# Patient Record
Sex: FEMALE | Race: WHITE
[De-identification: names, ages, dates, MRNs, and addresses within clinical notes are randomized per-mention and may not be internally consistent; named-entity substitution may affect disease eponyms.]

---

## 2021-03-22 ENCOUNTER — HOSPITAL ENCOUNTER (INPATIENT)
Dept: HOSPITAL 41 - JD.OBCHECK | Age: 36
LOS: 2 days | Discharge: HOME | DRG: 560 | End: 2021-03-24
Attending: OBSTETRICS & GYNECOLOGY | Admitting: OBSTETRICS & GYNECOLOGY
Payer: COMMERCIAL

## 2021-03-22 DIAGNOSIS — Z3A.38: ICD-10-CM

## 2021-03-22 DIAGNOSIS — Z20.822: ICD-10-CM

## 2021-03-22 PROCEDURE — 00HU33Z INSERTION OF INFUSION DEVICE INTO SPINAL CANAL, PERCUTANEOUS APPROACH: ICD-10-PCS

## 2021-03-22 PROCEDURE — U0002 COVID-19 LAB TEST NON-CDC: HCPCS

## 2021-03-22 PROCEDURE — 0KQM0ZZ REPAIR PERINEUM MUSCLE, OPEN APPROACH: ICD-10-PCS | Performed by: OBSTETRICS & GYNECOLOGY

## 2021-03-22 PROCEDURE — 3E0R3BZ INTRODUCTION OF ANESTHETIC AGENT INTO SPINAL CANAL, PERCUTANEOUS APPROACH: ICD-10-PCS

## 2021-03-22 RX ADMIN — EPHEDRINE SULFATE PRN MG: 50 INJECTION, SOLUTION INTRAVENOUS at 14:43

## 2021-03-22 RX ADMIN — EPHEDRINE SULFATE PRN MG: 50 INJECTION, SOLUTION INTRAVENOUS at 15:16

## 2021-03-22 RX ADMIN — EPHEDRINE SULFATE PRN MG: 50 INJECTION, SOLUTION INTRAVENOUS at 14:41

## 2021-03-22 NOTE — PCM.SN.2
- Free Text/Narrative


Note: 





Anesthesia Note: ([id number])





Placement of epidural 17 gauge tuohy resulted in wet tap.


Catheter advanced and secured at 12 cm.


Negative test dose noted, bolus of 5ml's of 0.25% marcaine given with negative 

aspiration, and negative heme.





Patient responded with excellent pain control. Motor control lost on right leg, 

diminished but present on left leg. 


Patient B/P decreased to 60's systolic with ephedrine and phenylephrine 

administered and B/P returned to 110's/ 40-50's.


Nurse instructed to maintain mean B/P at 70.





No Spinal catheter infusion initiated.


Nurse/and patient instructed to contact anesthesia for a spinal catheter rebolus

if warranted.





Patient and  educated on inadvertent dural puncture and educated on 

potential need for blood patch in the event a post dural puncture headache 

presents itself.





 Post Delivery:


Spinal catheter dc'd, tip in tact. Patient encouraged to drink caffeinated b

everages, and IV ketorlac ordered per Dr. Marie.





Anesthesia will continue to assess and check in on patient in the event a spinal

headache occurs and a blood patch is warranted.











Thank you,


Anuradha STOREY

## 2021-03-22 NOTE — PCM.PREANE
Preanesthetic Assessment





- Procedure


Proposed Procedure: 





Epidural





- Anesthesia/Transfusion/Family Hx


Anesthesia History: Prior Anesthesia Without Reaction


Family History of Anesthesia Reaction: No


Transfusion History: No Prior Transfusion(s)


Intubation History: Unknown





- Review of Systems


General: No Symptoms


Pulmonary: No Symptoms


Cardiovascular: No Symptoms (History of preeclampsia)


Gastrointestinal: No Symptoms (GERD)


Neurological: No Symptoms


Other: Reports: None





- Physical Assessment


NPO Status Date: 03/22/21


NPO Status Time: 08:00


Vital Signs: 





                                Last Vital Signs











Temp  36.8 C   03/22/21 11:21


 


Pulse  80   03/22/21 11:21


 


Resp  16   03/22/21 11:21


 


BP  141/80 H  03/22/21 11:21


 


Pulse Ox  99   03/22/21 11:21











Height: 1.6 m


Weight: 101.151 kg


ASA Class: 3


Mental Status: Alert & Oriented x3


Airway Class: Mallampati = 2


Dentition: Reports: Normal Dentition, Caries


Thyro-Mental Finger Breadths: 3


Mouth Opening Finger Breadths: 3


ROM/Head Extension: Full


Lungs: Clear to Auscultation, Normal Respiratory Effort


Cardiovascular: Regular Rate, Regular Rhythm, No Murmurs





- Lab


Values: 





                             Laboratory Last Values











WBC  13.44 K/mm3 (3.98-10.04)  H  03/22/21  12:12    


 


RBC  4.07 M/mm3 (3.98-5.22)   03/22/21  12:12    


 


Hgb  12.1 gm/dl (11.2-15.7)   03/22/21  12:12    


 


Hct  36.2 % (34.1-44.9)   03/22/21  12:12    


 


MCV  88.9 fl (79.4-94.8)  D 03/22/21  12:12    


 


MCH  29.7 pg (25.6-32.2)   03/22/21  12:12    


 


MCHC  33.4 g/dl (32.2-35.5)   03/22/21  12:12    


 


RDW Std Deviation  46.4 fL (36.4-46.3)  H  03/22/21  12:12    


 


Plt Count  219 K/mm3 (182-369)  D 03/22/21  12:12    


 


MPV  10.5 fl (9.4-12.3)   03/22/21  12:12    


 


Neut % (Auto)  85.2 % (34.0-71.1)  H  03/22/21  12:12    


 


Lymph % (Auto)  8.0 % (19.3-51.7)  L  03/22/21  12:12    


 


Mono % (Auto)  6.0 % (4.7-12.5)   03/22/21  12:12    


 


Eos % (Auto)  0.2  (0.7-5.8)  L  03/22/21  12:12    


 


Baso % (Auto)  0.1 % (0.1-1.2)   03/22/21  12:12    


 


Neut # (Auto)  11.45 K/mm3 (1.56-6.13)  H  03/22/21  12:12    


 


Lymph # (Auto)  1.08 K/mm3 (1.18-3.74)  L  03/22/21  12:12    


 


Mono # (Auto)  0.80 K/mm3 (0.24-0.36)  H  03/22/21  12:12    


 


Eos # (Auto)  0.03 K/mm3 (0.04-0.36)  L  03/22/21  12:12    


 


Baso # (Auto)  0.01 K/mm3 (0.01-0.08)   03/22/21  12:12    


 


Manual Slide Review  Abnormal smear   03/22/21  12:12    


 


SARS-CoV-2 RNA (BARTOLO)  Negative  (NEGATIVE)   03/22/21  12:15    


 


Blood Type  A POSITIVE   03/22/21  12:12    


 


Gel Antibody Screen  Negative   03/22/21  12:12    








Above labs reviewed and noted and within acceptable ranges to proceed with 

epidural if desired.





- Allergies


Allergies/Adverse Reactions: 


                                    Allergies











Allergy/AdvReac Type Severity Reaction Status Date / Time


 


No Known Allergies Allergy   Verified 03/22/21 11:21














- Anesthesia Plan


Pre-Op Medication Ordered: None





- Acknowledgements


Anesthesia Type Planned: Epidural


Pt an Appropriate Candidate for the Planned Anesthesia: Yes


Alternatives and Risks of Anesthesia Discussed w Pt/Guardian: Yes


Pt/Guardian Understands and Agrees with Anesthesia Plan: Yes





PreAnesthesia Questionnaire





- Past Health History


Medical/Surgical History: Denies Medical/Surgical History


OB/GYN History: Reports: Pregnancy


Psychiatric History: Reports: Depression


Other Psychiatric History: Postpartum after first baby


Endocrine/Metabolic History: Reports: Obesity/BMI 30+





- SUBSTANCE USE


Tobacco Use Status *Q: Never Tobacco User


Second Hand Smoke Exposure: No


Recreational Drug Use History: No





- HOME MEDS


Home Medications: 


                                    Home Meds





Acetaminophen/oxyCODONE [Percocet 325-5 MG] 2 tab PO Q4H PRN  tablet 07/13/19 

[Rx]


Ibuprofen [Motrin] 800 mg PO Q8H PRN  tablet 07/13/19 [Rx]


Pnv No.95/Ferrous Fum/Folic AC [Prenavite Tablet] 1 each PO DAILY #100 tablet 

07/13/19 [Rx]











- CURRENT (IN HOUSE) MEDS


Current Meds: 





                               Current Medications





Lactated Ringer's (Ringers, Lactated)  1,000 mls @ 100 mls/hr IV ASDIRECTED SALOME


   Last Infusion: 03/22/21 13:31 Dose:  0 mls/hr


   Documented by: 


Ampicillin Sodium 1 gm/ Sodium (Chloride)  100 mls @ 200 mls/hr IV Q4H SALOME


Oxytocin/Lactated Ringer's (Pitocin In Lr 10 Units/1,000 Ml)  10 unit in 1,000 

mls @ 12 mls/hr IV TITRATE SALOME; Protocol


Oxytocin/Lactated Ringer's (Pitocin In Lr 10 Units/1,000 Ml)  10 unit in 1,000 

mls @ 500 mls/hr IV .CONTINUOUS SALOME


Nalbuphine HCl (Nalbuphine 10 Mg/1 Ml Vial)  10 mg IVPUSH Q2H PRN


   PRN Reason: Pain


Ondansetron HCl (Ondansetron 4 Mg/2 Ml Sdv)  4 mg IVPUSH Q4H PRN


   PRN Reason: Nausea/Vomiting


Sodium Chloride (Sodium Chloride 0.9% 10 Ml Syringe)  10 ml FLUSH ASDIRECTED PRN


   PRN Reason: Keep Vein Open





Discontinued Medications





Ampicillin Sodium 2 gm/ Sodium (Chloride)  100 mls @ 200 mls/hr IV ONETIME ONE


   Stop: 03/22/21 12:29


   Last Admin: 03/22/21 12:30 Dose:  200 mls/hr


   Documented by:

## 2021-03-22 NOTE — PCM.LDHP
L&D History of Present Illness





- General


Date of Service: 21


Admit Problem/Dx: 


                   Patient Status Order with Admit Dx/Problem





21 11:21


Patient Status [ADT] Routine 





21 11:51


Patient Status [ADT] Routine 








                           Admission Diagnosis/Problem











Admission Diagnosis/Problem    Pregnancy














Source of Information: Patient


History Limitations: Reports: No Limitations





- History of Present Illness


Introduction:: 





Valorie Pena is a GBS + A+ 35 year old   x 1  for breech 

presentation x 1 female at 38-6 weeks gestation age (SCOT 21) by early US 

and LMP who presents today for signs of labor. She reports contractions that 

began at 0645 that have been increasing in strength and frequency. She reports 

bloody show, but denies chivo bleeding. She denies leaking of fluid. 


Present Illness Comments:: 





Valorie Pena is a GBS + A+ 35 year old   x 1  for breech 

presentation x 1 female at 38-6 weeks gestation age (SCOT 21) by early US 

and LMP who presents today for signs of labor. Patient has received routine 

prenatal care and denies any complications during her pregnancy. She received 

the Tdap vaccine on 21 and her flu vaccie 10/27/20. 





OBGYN History





11/19/15:  with vaccum extraction following induction due to preeclampsia; 

male infant weighing 7 lbs 0 ounces, "Gaurav"


19:  for breech presentation following failed cephalic version 

with tight nuchal cord and true knot. Female infant weighing 8 lbs 15 ounces, 

"Guru"


G3: current pregnancy





Prenatal labs: 


Blood type: A+ 


Antibody screen: Negative 


Rubella status: immune


Hepatitis B surface antigen: unknown


RPR: negative 


HIV: negative


Gonorrhea: Negative


Chlamydia: negative


Anatomy US: 10/29/20 with follow up 20 Normal growth, KELLY, placental 

position, anatomy 


One hour glucose tolerance test: 82


Second trimester hematocrit/hemoglobin: 12.6


Platelets: 341,000


GBS status: positive





- Related Data


Allergies/Adverse Reactions: 


                                    Allergies











Allergy/AdvReac Type Severity Reaction Status Date / Time


 


No Known Allergies Allergy   Verified 21 11:21











Home Medications: 


                                    Home Meds





Acetaminophen/oxyCODONE [Percocet 325-5 MG] 2 tab PO Q4H PRN  tablet 19 

[Rx]


Ibuprofen [Motrin] 800 mg PO Q8H PRN  tablet 19 [Rx]


Pnv No.95/Ferrous Fum/Folic AC [Prenavite Tablet] 1 each PO DAILY #100 tablet 

19 [Rx]











Past Medical History





- Past Health History


Medical/Surgical History: Denies Medical/Surgical History


OB/GYN History: Reports: Pregnancy





Social & Family History





- Family History


Family Medical History: No Pertinent Family History





- Caffeine Use


Caffeine Use: Reports: None





H&P Review of Systems





- Review of Systems:


Review Of Systems: See Below


General: Reports: No Symptoms


HEENT: Reports: No Symptoms


Pulmonary: Reports: No Symptoms


Cardiovascular: Reports: No Symptoms


Gastrointestinal: Reports: No Symptoms


Genitourinary: Reports: No Symptoms


Musculoskeletal: Reports: No Symptoms


Skin: Reports: No Symptoms


Psychiatric: Reports: No Symptoms


Neurological: Reports: No Symptoms





L&D Exam





- Exam


Exam: See Below





- Vital Signs


Vital Signs: 


                                Last Vital Signs











Temp  98.3 F   21 11:21


 


Pulse  80   21 11:21


 


Resp  16   21 11:21


 


BP  141/80 H  21 11:21


 


Pulse Ox  99   21 11:21











Weight: 223 lb





- OB Specific


Contraction Intensity: Moderate to Strong


Fetal Movement: Active


Fetal Heart Tones: Present





- Sandoval Score


Sandoval Score Cervix Position: Midposition


Sandoval Score Consistency: Soft


Sandoval Score Effacement: >80%


Sandoval Score Dilation: 3-4 cm


Sandoval Score Infant's Station: -2


Sandoval Score Total: 9





- Exam


General: Alert, Oriented


HEENT: Conjunctiva Clear, EOMI


Lungs: Clear to Auscultation, Normal Respiratory Effort


Cardiovascular: Regular Rate, Regular Rhythm


GI/Abdominal Exam: Normal Bowel Sounds, Soft


Genitourinary: Normal external exam


Extremities: Normal Inspection, No Pedal Edema, Normal Capillary Refill


Skin: Warm, Dry, Intact


Psychiatric: Alert, Normal Affect, Normal Mood





- Patient Data


Lab Results Last 24 hrs: 


                         Laboratory Results - last 24 hr











  21 Range/Units





  12:12 


 


WBC  13.44 H  (3.98-10.04)  K/mm3


 


RBC  4.07  (3.98-5.22)  M/mm3


 


Hgb  12.1  (11.2-15.7)  gm/dl


 


Hct  36.2  (34.1-44.9)  %


 


MCV  88.9  D  (79.4-94.8)  fl


 


MCH  29.7  (25.6-32.2)  pg


 


MCHC  33.4  (32.2-35.5)  g/dl


 


RDW Std Deviation  46.4 H  (36.4-46.3)  fL


 


Plt Count  219  D  (182-369)  K/mm3


 


MPV  10.5  (9.4-12.3)  fl


 


Neut % (Auto)  85.2 H  (34.0-71.1)  %


 


Lymph % (Auto)  8.0 L  (19.3-51.7)  %


 


Mono % (Auto)  6.0  (4.7-12.5)  %


 


Eos % (Auto)  0.2 L  (0.7-5.8)  


 


Baso % (Auto)  0.1  (0.1-1.2)  %


 


Neut # (Auto)  11.45 H  (1.56-6.13)  K/mm3


 


Lymph # (Auto)  1.08 L  (1.18-3.74)  K/mm3


 


Mono # (Auto)  0.80 H  (0.24-0.36)  K/mm3


 


Eos # (Auto)  0.03 L  (0.04-0.36)  K/mm3


 


Baso # (Auto)  0.01  (0.01-0.08)  K/mm3


 


Manual Slide Review  Abnormal smear  











Result Diagrams: 


                                 21 12:12








- Problem List


(1) 38 weeks gestation of pregnancy


SNOMED Code(s): 11151808


   ICD Code: Z3A.38 - 38 WEEKS GESTATION OF PREGNANCY   Status: Acute   Current 

Visit: Yes   





(2) Group B streptococcal carriage complicating pregnancy


SNOMED Code(s): 695648620725495


   ICD Code: O99.820 - STREPTOCOCCUS B CARRIER STATE COMPLICATING PREGNANCY   

Status: Acute   Current Visit: Yes   





(3) History of pre-eclampsia


SNOMED Code(s): 322484526999180


   ICD Code: Z87.59 - PERSONAL HISTORY OF COMP OF PREG, CHLDBRTH AND THE PUERP  

 Status: Acute   Current Visit: Yes   





(4) History of 


SNOMED Code(s): 423192576


   ICD Code: Z98.891 - HISTORY OF UTERINE SCAR FROM PREVIOUS SURGERY   Status: 

Acute   Current Visit: Yes   


Problem List Initiated/Reviewed/Updated: Yes


Orders Last 24hrs: 


                               Active Orders 24 hr











 Category Date Time Status


 


 Patient Status [ADT] Routine ADT  21 11:51 Active


 


 Activity as Tolerated [RC] PFP Care  21 11:51 Active


 


 Communication Order [RC] ASDIRECTED Care  21 11:51 Active


 


 Fetal Heart Tones [RC] ASDIRECTED Care  21 11:51 Active


 


 Fetal Non Stress Test [RC] PER UNIT ROUTINE Care  21 11:21 Active


 


 Notify Provider [RC] PFP Care  21 11:51 Active


 


 Notify Provider [RC] PRN Care  21 11:51 Active


 


 Peripheral IV Care [RC] .AS DIRECTED Care  21 11:51 Active


 


 Pump Management, Intrathecal [RC] ASDIRECTED Care  21 11:52 Active


 


 Vital Signs [RC] PER UNIT ROUTINE Care  21 11:21 Active


 


 Vital Signs [RC] PER UNIT ROUTINE Care  21 11:51 Active


 


 Regular Diet [DIET] Diet  21 Lunch Active


 


 CORONAVIRUS COVID-19 BARTOLO [MOLEC] Stat Lab  21 12:15 Received


 


 HEP C VIRUS AB [REF] Stat Lab  21 12:12 Received


 


 RAPID PLASMA REAGIN,RPR [CHEM] Routine Lab  21 12:12 Received


 


 TYPE AND SCREEN [BBK] Stat Lab  21 12:12 Received


 


 Ampicillin 1 gm Med  21 16:00 Active





 Sodium Chloride 0.9% [Normal Saline] 100 ml   





 IV Q4H   


 


 Lactated Ringers [Ringers, Lactated] 1,000 ml Med  21 12:00 Active





 IV ASDIRECTED   


 


 Nalbuphine [Nubain] Med  21 11:51 Active





 10 mg IVPUSH Q2H PRN   


 


 Ondansetron [Zofran] Med  21 11:51 Active





 4 mg IVPUSH Q4H PRN   


 


 Oxytocin/Lactated Ringers [Pitocin in LR 10 Units/1,000 Med  21 12:00 

Active





 ML]   





 10 unit in 1,000 ml IV .CONTINUOUS   


 


 Oxytocin/Lactated Ringers [Pitocin in LR 10 Units/1,000 Med  21 12:00 

Active





 ML]   





 10 unit in 1,000 ml IV TITRATE   


 


 Sodium Chloride 0.9% [Saline Flush] Med  21 11:51 Active





 10 ml FLUSH ASDIRECTED PRN   


 


 Electronic Fetal Heart Tones Ext w TOCO [WOMSER] Oth  21 11:51 Ordered





 Routine   


 


 Electronic Fetal Heart Tones Internal [WOMSER] Per Unit Oth  21 11:51 

Ordered





 Routine   


 


 Peripheral IV Insertion Adult [OM.PC] Routine Oth  21 11:51 Ordered


 


 Resuscitation Status Routine Resus Stat  21 11:21 Ordered








                                Medication Orders





Lactated Ringer's (Ringers, Lactated)  1,000 mls @ 100 mls/hr IV ASDIRECTED SALOME


   Last Admin: 21 12:30  Dose: 100 mls/hr


   Documented by: [email or web address]


Ampicillin Sodium 1 gm/ Sodium (Chloride)  100 mls @ 200 mls/hr IV Q4H SALOME


Oxytocin/Lactated Ringer's (Pitocin In Lr 10 Units/1,000 Ml)  10 unit in 1,000 

mls @ 12 mls/hr IV TITRATE SALOME; Protocol


Oxytocin/Lactated Ringer's (Pitocin In Lr 10 Units/1,000 Ml)  10 unit in 1,000 

mls @ 500 mls/hr IV .CONTINUOUS SALOME


Nalbuphine HCl (Nalbuphine 10 Mg/1 Ml Vial)  10 mg IVPUSH Q2H PRN


   PRN Reason: Pain


Ondansetron HCl (Ondansetron 4 Mg/2 Ml Sdv)  4 mg IVPUSH Q4H PRN


   PRN Reason: Nausea/Vomiting


Sodium Chloride (Sodium Chloride 0.9% 10 Ml Syringe)  10 ml FLUSH ASDIRECTED PRN


   PRN Reason: Keep Vein Open








Assessment/Plan Comment:: 





Valorie Pena is a GBS + A+ 35 year old   x 1  for breech 

presentation x 1 female at 38-6 weeks gestation age (SCOT 21) by early US 

and LMP who presents today for signs of labor. Patient desires a TOLAC. 








AROM was performed; scant blood tinged fluid noted. Mother and baby tolerated 

the procedure well. 





1. Active labor - desires TOLAC. Risks and benefits discussed with patient. Head

position confirmed with bedside US. Augmentation of labor with AROM and Pitocin 

as indicated


2. GBS + - 2 mg ampicillin loading dose with 1 mg q 4 hours 


3. A+ with negative antibody screen


4. Rubella immune


5. Continuous monitoring


6. Activity as tolerated


7. Small amounts of regular diet


8. Pain management as patient desires 


9. Plans to breastfeed


10. Anticipate vaginal delivery unless otherwise indicated

## 2021-03-22 NOTE — PCM.SN.2
- Free Text/Narrative


Note: 





Anesthesia Note:





Start: 1955


Stop: 2000





Patient dilated to 9cm. Patient c/o pain 9/10. Patient requests spinal catheter 

(inadvertent) rebolused for pain control.





2ml's of 1.5% lidocaine with 1:200,000 epi with 3ml's of 0.25% bupivacaine 

administered.





Patient received excellent pain relief.


200mcg of phenylephrine given for symptomatic hypotension systolic in the 80's.


Blood pressure returned to 110's and patient resting comfortably.





Thank you,


Anuradha STOREY

## 2021-03-22 NOTE — PCM.SN.2
- Free Text/Narrative


Note: 





Valorie Pena is a GBS + A+ 35 year old [id number] of  x 1, 

for breech presentation x 1 female at 38-6 weeks gestation age (SOCT 21) by

early US and LMP who presents today for signs of labor. She reports contractions

that began at 0645 that have been increasing in strength and frequency.


Babies heart tones were reassuring with contractions occurring every 3 to 5 

minutes.  She progressed steadily towards complete dilation.  She had an 

epidural placed for labor analgesia with fair results.  There were questions as 

to whether this was a wet tap.





Patient became complete and pushed for approximately 30 minutes.  She delivered 

a viable, hansen, male infant with Apgars of 7 and 9, a weight of 4290 g (9 

pounds 7.3 ounces) and a length of 22.0 inches at 2102 hrs. on 3/22/2021..  The 

patient had a moderate shoulder dystocia and the anterior shoulder was delivered

with moderate posterior motion of the anterior shoulder, along with a sweeping 

finger movement bring the anterior shoulder forward.  This was further 

accompanied by suprapubic pressure offered by labor and delivery nurse Yeny. 

With this baby delivered was placed on mom's abdomen.  The cord was clamped x2 

and cut by the baby's Father Luis Felipe.





Patient was noted to have a second-degree perineal laceration.  IV Pitocin was 

increased to 500 cc an hour per with Pitocin solution per protocol to facilitate

increase in uterine tone and decrease likelihood of bleeding.  Nose and mouth 

were bulb suction and the baby was taken to the warmer for further evaluation 

and resuscitation.  The baby spontaneously cried and actually pinked up very 

well.  No positive pressure ventilation was necessary.





The placenta delivered in a Alba presentation at 2109 hrs., appeared intact 

and complete and was discarded per patient desire.  The umbilical cord had 3 

vessels present within it.





The second-degree perineal laceration area was infiltrated with lidocaine 1% 

approximately 15 cc.  I was then sutured in a routine fashion using 3-0 Monocryl

suture.  Patient tolerated this well.





Patient plans to breast-feed.  Estimated blood loss was 350 cc.  Condition: 

Good.

## 2021-03-23 NOTE — PCM.PNPP
- General Info


Date of Service: 21


Subjective Update: 





No complaints of headache. Doing well.


Some cramping. Nursing well.





- Review of Systems


General: Reports: No Symptoms


HEENT: Reports: No Symptoms


Pulmonary: Reports: No Symptoms


Cardiovascular: Reports: No Symptoms


Gastrointestinal: Reports: No Symptoms


Genitourinary: Reports: No Symptoms


Musculoskeletal: Reports: No Symptoms


Skin: Reports: No Symptoms


Neurological: Reports: No Symptoms


Psychiatric: Reports: No Symptoms





- General Info


Date of Service: 21





- Patient Data


Vital Signs - Most Recent: 


                                Last Vital Signs











Temp  36.9 C   21 02:24


 


Pulse  92   21 02:24


 


Resp  16   21 02:24


 


BP  124/69   21 02:24


 


Pulse Ox  98   21 02:24











Weight - Most Recent: 101.151 kg


I&O - Last 24 Hours: 


                                 Intake & Output











 21





 22:59 06:59 14:59


 


Intake Total  6300 


 


Output Total 1100 458 


 


Balance -1100 5842 











Lab Results - Last 24 Hours: 


                         Laboratory Results - last 24 hr











  21 Range/Units





  12:12 12:12 12:12 


 


WBC   13.44 H   (3.98-10.04)  K/mm3


 


RBC   4.07   (3.98-5.22)  M/mm3


 


Hgb   12.1   (11.2-15.7)  gm/dl


 


Hct   36.2   (34.1-44.9)  %


 


MCV   88.9  D   (79.4-94.8)  fl


 


MCH   29.7   (25.6-32.2)  pg


 


MCHC   33.4   (32.2-35.5)  g/dl


 


RDW Std Deviation   46.4 H   (36.4-46.3)  fL


 


Plt Count   219  D   (182-369)  K/mm3


 


MPV   10.5   (9.4-12.3)  fl


 


Neut % (Auto)   85.2 H   (34.0-71.1)  %


 


Lymph % (Auto)   8.0 L   (19.3-51.7)  %


 


Mono % (Auto)   6.0   (4.7-12.5)  %


 


Eos % (Auto)   0.2 L   (0.7-5.8)  


 


Baso % (Auto)   0.1   (0.1-1.2)  %


 


Neut # (Auto)   11.45 H   (1.56-6.13)  K/mm3


 


Lymph # (Auto)   1.08 L   (1.18-3.74)  K/mm3


 


Mono # (Auto)   0.80 H   (0.24-0.36)  K/mm3


 


Eos # (Auto)   0.03 L   (0.04-0.36)  K/mm3


 


Baso # (Auto)   0.01   (0.01-0.08)  K/mm3


 


Manual Slide Review   Abnormal smear   


 


RPR  Non-reactive    (NONREACTIVE)  


 


SARS-CoV-2 RNA ([place])     (NEGATIVE)  


 


Blood Type    A POSITIVE  


 


Gel Antibody Screen    Negative  














  21 Range/Units





  12:15 


 


WBC   (3.98-10.04)  K/mm3


 


RBC   (3.98-5.22)  M/mm3


 


Hgb   (11.2-15.7)  gm/dl


 


Hct   (34.1-44.9)  %


 


MCV   (79.4-94.8)  fl


 


MCH   (25.6-32.2)  pg


 


MCHC   (32.2-35.5)  g/dl


 


RDW Std Deviation   (36.4-46.3)  fL


 


Plt Count   (182-369)  K/mm3


 


MPV   (9.4-12.3)  fl


 


Neut % (Auto)   (34.0-71.1)  %


 


Lymph % (Auto)   (19.3-51.7)  %


 


Mono % (Auto)   (4.7-12.5)  %


 


Eos % (Auto)   (0.7-5.8)  


 


Baso % (Auto)   (0.1-1.2)  %


 


Neut # (Auto)   (1.56-6.13)  K/mm3


 


Lymph # (Auto)   (1.18-3.74)  K/mm3


 


Mono # (Auto)   (0.24-0.36)  K/mm3


 


Eos # (Auto)   (0.04-0.36)  K/mm3


 


Baso # (Auto)   (0.01-0.08)  K/mm3


 


Manual Slide Review   


 


RPR   (NONREACTIVE)  


 


SARS-CoV-2 RNA ([place])  Negative  (NEGATIVE)  


 


Blood Type   


 


Gel Antibody Screen   











Med Orders - Current: 


                               Current Medications





Acetaminophen (Acetaminophen 325 Mg Tab)  650 mg PO Q4H PRN


   PRN Reason: mild pain or fever


Benzocaine/Menthol (Benzocaine/Menthol 20%-0.5% Spray 56 Gm Canister)  0 gm TOP 

ASDIRECTED PRN


   PRN Reason: Perineal Comfort Measure


   Last Admin: 21 23:23 Dose:  1 can


   Documented by: 


Docusate Sodium (Docusate Sodium 100 Mg Cap)  100 mg PO BID PRN


   PRN Reason: Constipation


Lactated Ringer's (Ringers, Lactated)  1,000 mls @ 100 mls/hr IV ASDIRECTED Formerly [facility], 1928–1983


   Last Admin: 21 01:24 Dose:  100 mls/hr


   Documented by: 


Ibuprofen (Ibuprofen 600 Mg Tab)  600 mg PO Q4H PRN


   PRN Reason: Mild pain or fever


   Last Admin: 21 10:18 Dose:  600 mg


   Documented by: 


Witch Hazel (Witch Hazel Medicated Pads 40/Jar)  1 pad TOP ASDIRECTED PRN


   PRN Reason: Perineal Comfort Measure


   Last Admin: 21 23:24 Dose:  1 tub


   Documented by: 





Discontinued Medications





Ephedrine Sulfate (Ephedrine 50 Mg/Ml Sdv)  5 mg IVPUSH ASDIRECTED PRN


   PRN Reason: Hypotension


   Last Admin: 21 15:16 Dose:  5 mg


   Documented by: 


Fentanyl (Fentanyl 100 Mcg/2 Ml Sdv)  100 mcg EPIDUR Q3H PRN


   PRN Reason: Pain


   Last Admin: 21 14:03 Dose:  100 mcg


   Documented by: 


Fentanyl/Bupivacaine HCl (Bupivacaine/Fentanyl/Ns 100 Ml Bag)  100 ml EPIDUR 

ASDIRECTED [facility], 1928–1983


   Last Admin: 21 14:03 Dose:  100 ml


   Documented by: 


Lactated Ringer's (Ringers, Lactated)  1,000 mls @ 100 mls/hr IV ASDIRECTED [facility], 1928–1983


   Last Admin: 21 20:00 Dose:  125 mls/hr


   Documented by: 


Ampicillin Sodium 2 gm/ Sodium (Chloride)  100 mls @ 200 mls/hr IV ONETIME ONE


   Stop: 21 12:29


   Last Admin: 21 12:30 Dose:  200 mls/hr


   Documented by: 


Ampicillin Sodium 1 gm/ Sodium (Chloride)  100 mls @ 200 mls/hr IV Q4H [facility], 1928–[facility]


   Last Admin: 21 20:00 Dose:  200 mls/hr


   Documented by: 


Oxytocin/Lactated Ringer's (Pitocin In Lr 10 Units/1,000 Ml)  10 unit in 1,000 

mls @ 12 mls/hr IV TITRATE SALOME; Protocol


Oxytocin/Lactated Ringer's (Pitocin In Lr 10 Units/1,000 Ml)  10 unit in 1,000 

mls @ 500 mls/hr IV .CONTINUOUS SALOME


   Last Admin: 21 21:02 Dose:  500 mls/hr


   Documented by: 


Lidocaine HCl (Xylocaine-Mpf 1%) Confirm Administered Dose 8 mls @ as directed 

.ROUTE .STK-MED ONE


   Stop: 21 02:43


Ketorolac Tromethamine (Ketorolac 30 Mg/Ml Sdv)  30 mg IVPUSH ONETIME ONE


   Stop: 21 22:45


   Last Admin: 21 23:08 Dose:  30 mg


   Documented by: 


Lidocaine HCl (Lidocaine 1% 50 Ml Mdv) Confirm Administered Dose 50 ml .ROUTE 

.STK-MED ONE


   Stop: 21 21:07


   Last Admin: 21 04:46 Dose:  Not Given


   Documented by: 


Lidocaine HCl (Lidocaine 1% 20 Ml Mdv)  50 ml INJECT ONETIME ONE


   Stop: 21 01:07


   Last Admin: 21 21:10 Dose:  10 ml


   Documented by: 


Miscellaneous Medication (Phenylephrine Hcl In 0.9% Nacl 1 Mg/10 Ml Syringe)  

0.1 mg IVPUSH Q10M PRN


   PRN Reason: Hypotension


   Last Admin: 21 14:54 Dose:  0.1 mg


   Documented by: 


Nalbuphine HCl (Nalbuphine 10 Mg/1 Ml Vial)  10 mg IVPUSH Q2H PRN


   PRN Reason: Pain


Ondansetron HCl (Ondansetron 4 Mg/2 Ml Sdv)  4 mg IVPUSH Q4H PRN


   PRN Reason: Nausea/Vomiting


Ondansetron HCl (Ondansetron 4 Mg/2 Ml Sdv)  4 mg IVPUSH ONETIME PRN


   PRN Reason: Nausea/Vomiting


Sodium Chloride (Sodium Chloride 0.9% 10 Ml Syringe)  10 ml FLUSH ASDIRECTED PRN


   PRN Reason: Keep Vein Open











- Infant Interaction


Infant Disposition, Postpartum:  in Room with Family


Infant Interaction: Holding Infant


Support Person: 





- Postpartum Recovery Exam


Fundal Tone: Firm


Fundal Level: 1 Fingerbreadths Below Umbilicus


Fundal Placement: Midline


Lochia Amount: Small


Lochia Color: Rubra/Red


Episiotomy/Laceration: Approximated


Bladder Status: Voiding





- Exam


General: Alert, Oriented


HEENT: Pupils Equal


Neck: Supple


Lungs: Clear to Auscultation, Normal Respiratory Effort


Cardiovascular: Regular Rate, Regular Rhythm


GI/Abdominal Exam: Normal Bowel Sounds, Soft, Non-Tender, No Organomegaly, No 

Distention, No Abnormal Bruit, No Mass, Pelvis Stable


Extremities: Normal Inspection, Normal Range of Motion, Non-Tender, No Pedal 

Edema, Normal Capillary Refill


Neurological: No New Focal Deficit


Psy/Mental Status: Alert, Normal Affect, Normal Mood





- Problem List Review


Problem List Initiated/Reviewed/Updated: Yes





- Assessment


Assessment:: 





PPD1


doing great.


Minimal residual spinal headache when up.








- Plan


Plan:: 





Valorie Pena is a GBS + A+ 35 year old   x 1  for breech 

presentation x 1 female at 38-6 weeks gestation age (SCOT 21) by early US 

and LMP who presents today for signs of labor. Patient desired a TOLAC. 








S/P .


Doing well.


No complaints.


Home tomorrow

## 2021-03-23 NOTE — [id number]
Post Anesthesia Note





- EVALUATION WITHIN 48HRS OF ANESTHETIC


Vital Signs in Normal Range: Yes


Patient Participated in Evaluation: Yes


Respiratory Function Stable: Yes


Airway Patent: Yes


Cardiovascular Function Stable: Yes


Hydration Status Stable: Yes


Pain Control Satisfactory: Yes


Nausea and Vomiting Control Satisfactory: Yes


Mental Status Recovered: Yes


Vital Signs: 


                                Last Vital Signs











Temp  36.9 C   03/23/21 02:24


 


Pulse  92   03/23/21 02:24


 


Resp  16   03/23/21 02:24


 


BP  124/69   03/23/21 02:24


 


Pulse Ox  98   03/23/21 02:24














- COMMENTS/OBSERVATIONS


Free Text/Narrative:: 





Valorie developed a positional headache following a known post dural puncture 

for epidural placement. Blood patch was administered at 0143 last evening. She 

was able to get some rest last evening. She did get up this morning and noticed 

the headache but did feel like it was better than before the blood patch. 

Education provided for Valorie to dink lots of fluids today, try caffeine to 

help with the headache, tylenol/motrin, and rest when possible. We will continue

to monitor for improvement in headache symptoms. Questions answered at this 

time.

## 2021-03-23 NOTE — PCM.SN.2
- Free Text/Narrative


Note: 





Anesthesia Note:  (Blood Patch)





Start: 0143


Stop: 0225





Patient presents with PDPH that is positional and patient requests treatment 

relief via blood patch.


Risk/Benefits discussed, consent signed.





Patient sitting up for procedure. (Sterile technique noted with sterile gloves, 

mask, and sterile drapes utilized)





-Back cleansed with 3 betadine swabs.


-L3-L4 Site localized with 4ml's of 1% lidocaine.


-17 g tuohy epidural needle advanced with JOSEPHINE noted at 6.5 cm.





-20ml's of autologous blood obtained from patient with strict aseptic/sterile 

technique noted.


-20ml's of autologous blood injected via epidural needle.





Patient positioned supine and instructed to lay flat for 2 hours, and continue 

to rest for the remaining part of night.


Patient encouraged to rest tomorrow as well, with minimal exertion and movement 

encouraged.





Thank you,


Anuradha STOREY

## 2021-03-24 NOTE — [id number].[id number]
**Discharge Summary





- Hospital Course


Free Text/Narrative:: 





 [facility] ** LIVE **


                                        


                                        


                                        





                              Provider Simple Note





Patient Name: VALORIE PENA              Medical Record Number: [id number]


Date of Birth: [birth year]                                Patient Status: Inpatient


Attending Provider: Gaurav Marie                 Account Number: [id number]


Date: 21 22:36                         Initialization Date: 21 22:36








- Free Text/Narrative


Note: 





Valorie Pena is a GBS + A+ 35 year old [id number] of  x 1, 

for breech presentation x 1 female at 38-6 weeks gestation age (SCOT 21) by

early US and LMP who presents today for signs of labor. She reports contractions

that began at 0645 that have been increasing in strength and frequency.


Babies heart tones were reassuring with contractions occurring every 3 to 5 

minutes.  She progressed steadily towards complete dilation.  She had an 

epidural placed for labor analgesia with fair results.  There were questions as 

to whether this was a wet tap.





Patient became complete and pushed for approximately 30 minutes.  She delivered 

a viable, hansen, male infant with Apgars of 7 and 9, a weight of 4290 g (9 

pounds 7.3 ounces) and a length of 22.0 inches at 2102 hrs. on 3/22/2021..  The 

patient had a moderate shoulder dystocia and the anterior shoulder was delivered

with moderate posterior motion of the anterior shoulder, along with a sweeping 

finger movement bring the anterior shoulder forward.  This was further 

accompanied by suprapubic pressure offered by labor and delivery nurse Yeny. 

With this baby delivered was placed on mom's abdomen.  The cord was clamped x2 

and cut by the baby's Father Luis Felipe.





Patient was noted to have a second-degree perineal laceration.  IV Pitocin was 

increased to 500 cc an hour per with Pitocin solution per protocol to facilitate

increase in uterine tone and decrease likelihood of bleeding.  Nose and mouth 

were bulb suction and the baby was taken to the warmer for further evaluation 

and resuscitation.  The baby spontaneously cried and actually pinked up very 

well.  No positive pressure ventilation was necessary.





The placenta delivered in a Alba presentation at 2109 hrs., appeared intact 

and complete and was discarded per patient desire.  The umbilical cord had 3 

vessels present within it.





The second-degree perineal laceration area was infiltrated with lidocaine 1% 

approximately 15 cc.  I was then sutured in a routine fashion using 3-0 Monocryl

suture.  Patient tolerated this well.





Patient plans to breast-feed.  Estimated blood loss was 350 cc.  Condition: 

Good.





HPI Initial Comments: 





 [facility] ** LIVE **


                                        


                                        


                                        





                              Provider Simple Note





Patient Name: VALORIE PENA              Medical Record Number: [id number]


Date of Birth: [birth year]                                Patient Status: Inpatient


Attending Provider: Gaurav Marie                 Account Number: [id number]


Date: 21 22:36                         Initialization Date: 21 22:36








- Free Text/Narrative


Note: 





Valorie Pena is a GBS + A+ 35 year old [id number] of  x 1, 

for breech presentation x 1 female at 38-6 weeks gestation age (SCOT 21) by

early US and LMP who presents today for signs of labor. She reports contractions

that began at 0645 that have been increasing in strength and frequency.


Babies heart tones were reassuring with contractions occurring every 3 to 5 

minutes.  She progressed steadily towards complete dilation.  She had an 

epidural placed for labor analgesia with fair results.  There were questions as 

to whether this was a wet tap.





Patient became complete and pushed for approximately 30 minutes.  She delivered 

a viable, hansen, male infant with Apgars of 7 and 9, a weight of 4290 g (9 

pounds 7.3 ounces) and a length of 22.0 inches at 2102 hrs. on 3/22/2021..  The 

patient had a moderate shoulder dystocia and the anterior shoulder was delivered

with moderate posterior motion of the anterior shoulder, along with a sweeping 

finger movement bring the anterior shoulder forward.  This was further 

accompanied by suprapubic pressure offered by labor and delivery nurse Yeny. 

With this baby delivered was placed on mom's abdomen.  The cord was clamped x2 

and cut by the baby's Father Luis Felipe.





Patient was noted to have a second-degree perineal laceration.  IV Pitocin was 

increased to 500 cc an hour per with Pitocin solution per protocol to facilitate

increase in uterine tone and decrease likelihood of bleeding.  Nose and mouth 

were bulb suction and the baby was taken to the warmer for further evaluation 

and resuscitation.  The baby spontaneously cried and actually pinked up very 

well.  No positive pressure ventilation was necessary.





The placenta delivered in a Alba presentation at 2109 hrs., appeared intact 

and complete and was discarded per patient desire.  The umbilical cord had 3 

vessels present within it.





The second-degree perineal laceration area was infiltrated with lidocaine 1% 

approximately 15 cc.  I was then sutured in a routine fashion using 3-0 Monocryl

suture.  Patient tolerated this well.





Patient plans to breast-feed.  Estimated blood loss was 350 cc.  Condition: 

Good.





Brief History: [facility] ** LIVE **.  Provider Simple Note.  Patient 

Name: VALORIE PENA SUSherryical Record Number: [id number].  Date of Birth: 

[birth year]Patient Status: Inpatient.  Attending Provider: Gaurav Marie FAccount 

Number: [id number].  Date: 21 22:36Initialization Date: 21 22:36. 

- Free Text/Narrative.  Note:  Valorie Pena is a GBS + A+ 35 year old 

[id number] of  x 1,  for breech presentation x 1 female at 38-

6 weeks gestation age (SCOT 21) by early US and LMP who presents today for 

signs of labor. She reports contractions that began at 0645 that have been 

increasing in strength and frequency.  Babies heart tones were reassuring with 

contractions occurring every 3 to 5 minutes.  She progressed steadily towards 

complete dilation.  She had an epidural placed for labor analgesia with fair 

results.  There were questions as to whether this was a wet tap.  Patient became

complete and pushed for approximately 30 minutes.  She delivered a viable, 

hansen, male infant with Apgars of 7 and 9, a weight of 4290 g (9 pounds 7.3 

ounces) and a length of 22.0 inches at 2102 hrs. on 3/22/2021..  The patient had

a moderate shoulder dystocia and the anterior shoulder was delivered with 

moderate posterior motion of the anterior shoulder, along with a sweeping finger

movement bring the anterior shoulder forward.  This was further accompanied by 

suprapubic pressure offered by labor and delivery nurse Yeny.  With this baby

delivered was placed on mom's abdomen.  The cord was clamped x2 and cut by the 

baby's Father Luis Felipe.  Patient was noted to have a second-degree perineal 

laceration.  IV Pitocin was increased to 500 cc an hour per with Pitocin 

solution per protocol to facilitate increase in uterine tone and decrease 

likelihood of bleeding.  Nose and mouth were bulb suction and the baby was taken

to the warmer for further evaluation and resuscitation.  The baby spontaneously 

cried and actually pinked up very well.  No positive pressure ventilation was 

necessary.  The placenta delivered in a Alba presentation at 2109 hrs., 

appeared intact and complete and was discarded per patient desire.  The 

umbilical cord had 3 vessels present within it.  The second-degree perineal 

laceration area was infiltrated with lidocaine 1% approximately 15 cc.  I was 

then sutured in a routine fashion using 3-0 Monocryl suture.  Patient tolerated 

this well.  Patient plans to breast-feed.  Estimated blood loss was 350 cc.  Co

ndition: Good.


Diagnosis: Stroke: No





- Discharge Data


Discharge Date: 21


Discharge Disposition: Home, Self-Care 01


Condition: Good





- Referral to Home Health


Primary Care Physician: 


Jillian Bills NP








- Discharge Diagnosis/Problem(s)


(1) Second degree perineal laceration during delivery


SNOMED Code(s): 7418647


   ICD Code: O70.1 - SECOND DEGREE PERINEAL LACERATION DURING DELIVERY   Status:

Acute   Current Visit: Yes   





(2) Shoulder dystocia during labor and delivery, delivered


SNOMED Code(s): 267458067, 389502305


   ICD Code: O66.0 - OBSTRUCTED LABOR DUE TO SHOULDER DYSTOCIA   Status: Acute  

Current Visit: Yes   





(3) 38 weeks gestation of pregnancy


SNOMED Code(s): 50690620


   ICD Code: Z3A.38 - 38 WEEKS GESTATION OF PREGNANCY   Status: Acute   Current 

Visit: Yes   





(4) Group B streptococcal carriage complicating pregnancy


SNOMED Code(s): 812873909883935


   ICD Code: O99.820 - STREPTOCOCCUS B CARRIER STATE COMPLICATING PREGNANCY   

Status: Acute   Current Visit: Yes   





(5) History of 


SNOMED Code(s): 468027088


   ICD Code: Z98.891 - HISTORY OF UTERINE SCAR FROM PREVIOUS SURGERY   Status: 

Acute   Current Visit: Yes   





(6) History of pre-eclampsia


SNOMED Code(s): 290134697277367


   ICD Code: Z87.59 - PERSONAL HISTORY OF COMP OF PREG, CHLDBRTH AND THE PUERP  

Status: Acute   Current Visit: Yes   





- Patient Summary/Data


Complications: none


Consults: 


none


Hospital Course: 





uneventful





- Patient Instructions


Diet: Usual Diet as Tolerated


Driving: Do Not Drive (X2 weeks)


Showering/Bathing: May Shower


Notify Provider of: Fever, Increased Pain, Swelling and Redness, Drainage, 

Nausea and/or Vomiting





- Discharge Plan


*PRESCRIPTION DRUG MONITORING PROGRAM REVIEWED*: Not Applicable


*COPY OF PRESCRIPTION DRUG MONITORING REPORT IN PATIENT [facility]: Not Applicable


Home Medications: 


                                    Home Meds





Pnv No.95/Ferrous Fum/Folic AC [Prenatal Vitamins Tablet] 1 each PO DAILY #100 

tablet 19 [Rx]


Acetaminophen [Tylenol] 650 mg PO Q6H PRN  tablet 21 [Rx]


Benzocaine/Menthol [Dermoplast Pain Relief Spray] 1 spray TOP ASDIRECTED PRN  

canister 21 [Rx]


Docusate Sodium [Colace] 100 mg PO BID PRN  cap 21 [Rx]


Ibuprofen [Motrin] 600 mg PO Q6H PRN  tablet 21 [Rx]


witch hazeL [Tucks] 1 pad TOP ASDIRECTED PRN  pad 21 [Rx]








Referrals: 


Gaurav Marie MD [Family Provider] -  (Patient will call to make appointment 

to be seen by Dr. Marie in 2 weeks postpartum)





- Discharge Summary/Plan Comment


DC Time >30 min.: No





- Patient Data


Vitals - Most Recent: 


                                Last Vital Signs











Temp  98.4 F   21 03:51


 


Pulse  67   21 03:51


 


Resp  14   21 03:51


 


BP  120/69   21 03:51


 


Pulse Ox  97   21 03:51











Weight - Most Recent: 223 lb


I&O - Last 24 hours: 


                                 Intake & Output











 21





 22:59 06:59 14:59


 


Intake Total 360  


 


Balance 360  











Med Orders - Current: 


                               Current Medications





Acetaminophen (Acetaminophen 325 Mg Tab)  650 mg PO Q4H PRN


   PRN Reason: mild pain or fever


Benzocaine/Menthol (Benzocaine/Menthol 20%-0.5% Spray 56 Gm Canister)  0 gm TOP 

ASDIRECTED PRN


   PRN Reason: Perineal Comfort Measure


   Last Admin: 21 23:23 Dose:  1 can


   Documented by: 


Docusate Sodium (Docusate Sodium 100 Mg Cap)  100 mg PO BID PRN


   PRN Reason: Constipation


   Last Admin: 21 21:38 Dose:  100 mg


   Documented by: 


Lactated Ringer's (Ringers, Lactated)  1,000 mls @ 100 mls/hr IV ASDIRECTED [facility]


   Last Admin: 21 01:24 Dose:  100 mls/hr


   Documented by: 


Ibuprofen (Ibuprofen 600 Mg Tab)  600 mg PO Q4H PRN


   PRN Reason: Mild pain or fever


   Last Admin: 21 21:38 Dose:  600 mg


   Documented by: 


Witch Hazel (Witch Hazel Medicated Pads 40/Jar)  1 pad TOP ASDIRECTED PRN


   PRN Reason: Perineal Comfort Measure


   Last Admin: 21 23:24 Dose:  1 tub


   Documented by: 





Discontinued Medications





Bupivacaine HCl (Bupivacaine 0.25% 10 Ml Sdv)  10 ml .ROUTE .STK-MED ONE


   Stop: 21 17:01


Ephedrine Sulfate (Ephedrine 50 Mg/Ml Sdv)  5 mg IVPUSH ASDIRECTED PRN


   PRN Reason: Hypotension


   Last Admin: 21 15:16 Dose:  5 mg


   Documented by: 


Ephedrine Sulfate (Ephedrine 50 Mg/Ml Sdv)  50 mg .ROUTE .STK-MED ONE


   Stop: 21 17:01


Fentanyl (Fentanyl 100 Mcg/2 Ml Sdv)  100 mcg EPIDUR Q3H PRN


   PRN Reason: Pain


   Last Admin: 21 14:03 Dose:  100 mcg


   Documented by: 


Fentanyl/Bupivacaine HCl (Bupivacaine/Fentanyl/Ns 100 Ml Bag)  100 ml EPIDUR 

ASDIRECTED [facility]


   Last Admin: 21 14:03 Dose:  100 ml


   Documented by: 


Lactated Ringer's (Ringers, Lactated)  1,000 mls @ 100 mls/hr IV ASDIRECTED [facility]


   Last Admin: 21 20:00 Dose:  125 mls/hr


   Documented by: 


Ampicillin Sodium 2 gm/ Sodium (Chloride)  100 mls @ 200 mls/hr IV ONETIME ONE


   Stop: 21 12:29


   Last Admin: 21 12:30 Dose:  200 mls/hr


   Documented by: 


Ampicillin Sodium 1 gm/ Sodium (Chloride)  100 mls @ 200 mls/hr IV Q4H [facility]


   Last Admin: 21 20:00 Dose:  200 mls/hr


   Documented by: 


Oxytocin/Lactated Ringer's (Pitocin In Lr 10 Units/1,000 Ml)  10 unit in 1,000 

mls @ 12 mls/hr IV TITRATE [facility]; Protocol


Oxytocin/Lactated Ringer's (Pitocin In Lr 10 Units/1,000 Ml)  10 unit in 1,000 

mls @ 500 mls/hr IV .CONTINUOUS [facility]


   Last Admin: 21 21:02 Dose:  500 mls/hr


   Documented by: 


Lidocaine HCl (Xylocaine-Mpf 1%) Confirm Administered Dose 8 mls @ as directed 

.ROUTE .STK-MED ONE


   Stop: 21 02:43


Ketorolac Tromethamine (Ketorolac 30 Mg/Ml Sdv)  30 mg IVPUSH ONETIME ONE


   Stop: 21 22:45


   Last Admin: 21 23:08 Dose:  30 mg


   Documented by: 


Lidocaine HCl (Lidocaine 1% 50 Ml Mdv) Confirm Administered Dose 50 ml .ROUTE 

.STK-MED ONE


   Stop: 21 21:07


   Last Admin: 21 04:46 Dose:  Not Given


   Documented by: 


Lidocaine HCl (Lidocaine 1% 20 Ml Mdv)  50 ml INJECT ONETIME ONE


   Stop: 21 01:07


   Last Admin: 21 21:10 Dose:  10 ml


   Documented by: 


Miscellaneous Medication (Phenylephrine Hcl In 0.9% Nacl 1 Mg/10 Ml Syringe)  

0.1 mg IVPUSH Q10M PRN


   PRN Reason: Hypotension


   Last Admin: 21 14:54 Dose:  0.1 mg


   Documented by: 


Miscellaneous Medication (Phenylephrine Hcl In 0.9% Nacl 1 Mg/10 Ml Syringe)  1 

mg .ROUTE .STK-MED ONE


   Stop: 21 17:01


Nalbuphine HCl (Nalbuphine 10 Mg/1 Ml Vial)  10 mg IVPUSH Q2H PRN


   PRN Reason: Pain


Ondansetron HCl (Ondansetron 4 Mg/2 Ml Sdv)  4 mg IVPUSH Q4H PRN


   PRN Reason: Nausea/Vomiting


Ondansetron HCl (Ondansetron 4 Mg/2 Ml Sdv)  4 mg IVPUSH ONETIME PRN


   PRN Reason: Nausea/Vomiting


Sodium Chloride (Sodium Chloride 0.9% 10 Ml Syringe)  10 ml FLUSH ASDIRECTED PRN


   PRN Reason: Keep Vein Open

## 2021-03-24 NOTE — [id number]
Post Anesthesia Note





- EVALUATION WITHIN 48HRS OF ANESTHETIC


Vital Signs in Normal Range: Yes


Patient Participated in Evaluation: Yes


Respiratory Function Stable: Yes


Airway Patent: Yes


Cardiovascular Function Stable: Yes


Hydration Status Stable: Yes


Pain Control Satisfactory: Yes


Nausea and Vomiting Control Satisfactory: Yes


Mental Status Recovered: Yes


Vital Signs: 


                                Last Vital Signs











Temp  36.9 C   03/24/21 03:51


 


Pulse  67   03/24/21 03:51


 


Resp  14   03/24/21 03:51


 


BP  120/69   03/24/21 03:51


 


Pulse Ox  97   03/24/21 03:51














- COMMENTS/OBSERVATIONS


Free Text/Narrative:: 





No c/o headache noted.


Patient sitting up in bed, and planning on getting discharged today.


Thank you,


Anuradha STOREY